# Patient Record
Sex: MALE | Race: WHITE | NOT HISPANIC OR LATINO | URBAN - METROPOLITAN AREA
[De-identification: names, ages, dates, MRNs, and addresses within clinical notes are randomized per-mention and may not be internally consistent; named-entity substitution may affect disease eponyms.]

---

## 2022-08-17 ENCOUNTER — NEW REFERRAL (OUTPATIENT)
Dept: URBAN - METROPOLITAN AREA CLINIC 46 | Facility: CLINIC | Age: 72
End: 2022-08-17

## 2022-08-17 DIAGNOSIS — H16.223: ICD-10-CM

## 2022-08-17 DIAGNOSIS — Z96.1: ICD-10-CM

## 2022-08-17 DIAGNOSIS — H43.813: ICD-10-CM

## 2022-08-17 DIAGNOSIS — Z79.899: ICD-10-CM

## 2022-08-17 DIAGNOSIS — H35.383: ICD-10-CM

## 2022-08-17 PROCEDURE — 92134 CPTRZ OPH DX IMG PST SGM RTA: CPT

## 2022-08-17 PROCEDURE — 92202 OPSCPY EXTND ON/MAC DRAW: CPT

## 2022-08-17 PROCEDURE — 99244 OFF/OP CNSLTJ NEW/EST MOD 40: CPT

## 2022-08-17 ASSESSMENT — VISUAL ACUITY
OD_SC: 20/20-2
OS_SC: 20/20
OS_SC: 20/20-1
OD_SC: 20/20

## 2022-08-17 ASSESSMENT — TONOMETRY
OS_IOP_MMHG: 9
OD_IOP_MMHG: 8

## 2022-11-10 ENCOUNTER — PROBLEM (OUTPATIENT)
Dept: URBAN - METROPOLITAN AREA CLINIC 105 | Facility: CLINIC | Age: 72
End: 2022-11-10

## 2022-11-10 DIAGNOSIS — H18.603: ICD-10-CM

## 2022-11-10 DIAGNOSIS — H35.383: ICD-10-CM

## 2022-11-10 DIAGNOSIS — H43.813: ICD-10-CM

## 2022-11-10 DIAGNOSIS — H53.19: ICD-10-CM

## 2022-11-10 DIAGNOSIS — H16.223: ICD-10-CM

## 2022-11-10 PROCEDURE — 92134 CPTRZ OPH DX IMG PST SGM RTA: CPT

## 2022-11-10 PROCEDURE — 92014 COMPRE OPH EXAM EST PT 1/>: CPT

## 2022-11-10 PROCEDURE — 92202 OPSCPY EXTND ON/MAC DRAW: CPT

## 2022-11-10 ASSESSMENT — VISUAL ACUITY
OD_SC: 20/20-1
OS_SC: 20/20

## 2022-11-10 ASSESSMENT — TONOMETRY
OD_IOP_MMHG: 8
OS_IOP_MMHG: 14

## 2022-12-07 ENCOUNTER — FOLLOW UP (OUTPATIENT)
Dept: URBAN - METROPOLITAN AREA CLINIC 46 | Facility: CLINIC | Age: 72
End: 2022-12-07

## 2022-12-07 DIAGNOSIS — H18.603: ICD-10-CM

## 2022-12-07 DIAGNOSIS — H16.223: ICD-10-CM

## 2022-12-07 DIAGNOSIS — H43.813: ICD-10-CM

## 2022-12-07 DIAGNOSIS — H35.383: ICD-10-CM

## 2022-12-07 DIAGNOSIS — H53.19: ICD-10-CM

## 2022-12-07 PROCEDURE — 92202 OPSCPY EXTND ON/MAC DRAW: CPT

## 2022-12-07 PROCEDURE — 92250 FUNDUS PHOTOGRAPHY W/I&R: CPT

## 2022-12-07 PROCEDURE — 92014 COMPRE OPH EXAM EST PT 1/>: CPT

## 2022-12-07 PROCEDURE — 92134 CPTRZ OPH DX IMG PST SGM RTA: CPT

## 2022-12-07 ASSESSMENT — TONOMETRY
OS_IOP_MMHG: 13
OD_IOP_MMHG: 9

## 2022-12-07 ASSESSMENT — VISUAL ACUITY
OD_SC: 20/20-2
OS_SC: 20/20-1

## 2022-12-19 ENCOUNTER — PROBLEM (OUTPATIENT)
Dept: URBAN - METROPOLITAN AREA CLINIC 46 | Facility: CLINIC | Age: 72
End: 2022-12-19

## 2022-12-19 DIAGNOSIS — H35.383: ICD-10-CM

## 2022-12-19 DIAGNOSIS — G43.B0: ICD-10-CM

## 2022-12-19 DIAGNOSIS — H53.19: ICD-10-CM

## 2022-12-19 DIAGNOSIS — M06.9: ICD-10-CM

## 2022-12-19 PROCEDURE — 92012 INTRM OPH EXAM EST PATIENT: CPT

## 2022-12-19 PROCEDURE — 92202 OPSCPY EXTND ON/MAC DRAW: CPT

## 2022-12-19 ASSESSMENT — VISUAL ACUITY
OD_SC: 20/30+2
OS_SC: 20/20

## 2022-12-19 ASSESSMENT — TONOMETRY
OD_IOP_MMHG: 9
OS_IOP_MMHG: 9

## 2023-01-17 ENCOUNTER — TELEPHONE (OUTPATIENT)
Dept: NEUROSURGERY | Facility: CLINIC | Age: 73
End: 2023-01-17
Payer: MEDICARE

## 2023-01-17 DIAGNOSIS — M43.16 SPONDYLOLISTHESIS AT L4-L5 LEVEL: Primary | ICD-10-CM

## 2023-01-17 DIAGNOSIS — M81.0 OSTEOPOROSIS, SENILE: ICD-10-CM

## 2023-01-17 NOTE — TELEPHONE ENCOUNTER
New Patient:   Referred to Dr. Wiggins    Referring Provider:   Dr. Edil Mann (PCP) [place], [place], records scanned in chart    MRI:  Lumbar Spine 1/13/2023 @ [facility] in [place], [place], report scanned in chart, patient has disc    X-rays: Lumbar Spine 11/17/21 @ [facility], report scanned in chart    Dexa: No    EMG: No    Onset of sympotms/reason for visit:   Patient has had symptoms for 11 years. Low back pain that is achy radiating down both legs to the knees and sometimes ankles. He has tingling in both feet and hands. Weakness in both legs sometimes. Patient is concerned he is developing cauda equina. 7 months ago he started having trouble emptying his bladder in that he has to use a log of strength and it can take up to 15 minutes to empty. 4 months ago he wet the bed 1 time and has had 3 incidents of lack of bowel control. Pain goes from 4-8/10.     Physical Therapy: 2 yrs ago @[facility] in [place], [place] records scanned in chart    Pain Management: Injections 2022 Dr. Yusef Patel in [place], [place] (last one 10/22 helped for 3 months), records scanned in chart    Previous Surgery: No    Endocrinologist: No    Rheumatologist: Dr. Manoj Tavarez in [place] for osteopenia, records scanned in chart    Insurance: Medicare/[employer] sup

## 2023-01-18 NOTE — TELEPHONE ENCOUNTER
Patient scheduled 2/16 @ 2pm. Advised to have dexa and xray and bring MRI and xray discs. NP ppw emailed.

## 2023-01-18 NOTE — TELEPHONE ENCOUNTER
I've entered a response to the patient's message below.    Response to the message:       Lumbar Xrays (complete w/ flexion-extension).  DEXA.

## 2023-02-16 ENCOUNTER — OFFICE VISIT (OUTPATIENT)
Dept: NEUROSURGERY | Facility: CLINIC | Age: 73
End: 2023-02-16
Payer: MEDICARE

## 2023-02-16 VITALS
HEART RATE: 100 BPM | TEMPERATURE: 97.4 F | BODY MASS INDEX: 24.59 KG/M2 | WEIGHT: 153 LBS | SYSTOLIC BLOOD PRESSURE: 143 MMHG | HEIGHT: 66 IN | DIASTOLIC BLOOD PRESSURE: 84 MMHG | RESPIRATION RATE: 16 BRPM | OXYGEN SATURATION: 98 %

## 2023-02-16 DIAGNOSIS — M81.0 OSTEOPOROSIS, SENILE: ICD-10-CM

## 2023-02-16 DIAGNOSIS — M43.16 SPONDYLOLISTHESIS AT L4-L5 LEVEL: Primary | ICD-10-CM

## 2023-02-16 PROCEDURE — 99205 OFFICE O/P NEW HI 60 MIN: CPT | Performed by: NEUROLOGICAL SURGERY

## 2023-02-16 RX ORDER — BUPROPION HYDROCHLORIDE 150 MG/1
TABLET ORAL
COMMUNITY

## 2023-02-16 RX ORDER — AZELASTINE 1 MG/ML
1 SPRAY, METERED NASAL 2 TIMES DAILY
COMMUNITY

## 2023-02-16 RX ORDER — CAPTOPRIL 50 MG/1
TABLET ORAL
COMMUNITY

## 2023-02-16 RX ORDER — CICLOPIROX 1 G/100ML
SHAMPOO TOPICAL NIGHTLY
COMMUNITY

## 2023-02-16 RX ORDER — CITALOPRAM 40 MG/1
TABLET, FILM COATED ORAL
COMMUNITY

## 2023-02-16 RX ORDER — FLUTICASONE PROPIONATE 50 MCG
1 SPRAY, SUSPENSION (ML) NASAL DAILY
COMMUNITY

## 2023-02-16 RX ORDER — ROSUVASTATIN CALCIUM 40 MG/1
TABLET, COATED ORAL
COMMUNITY
Start: 2023-01-12

## 2023-02-16 RX ORDER — CLONAZEPAM 2 MG/1
TABLET ORAL
COMMUNITY

## 2023-02-16 RX ORDER — DENOSUMAB 60 MG/ML
INJECTION SUBCUTANEOUS
COMMUNITY

## 2023-02-16 NOTE — PROGRESS NOTES
Dear Dr. Tavarez,    I had the pleasure of meeting a patient of yours in the office today.  Thank you for your kind referral.  As you may recall, Mr. Blum is a very pleasant 72-year-old gentleman with severe rheumatoid arthritis and osteoporosis on Prolia.  He had a more than 11-year history of chronic lower back pain.  He will get achiness in his legs but he is not having radicular pain, sciatica, numbness, paresthesias or difficulty with bowel or bladder function.  He had a few episodes of urinary issues but currently is not having any.  He has no issues controlling bowel function.  He had a recent flareup of back pain and now he has been using a walker or cane for ambulation.  He walks with very slow and measured steps to avoid falling.  He feels as if his gait has imbalance but again denies any focal weakness such as foot drop, etc.  He has seen pain management in the past.  He did respond to facet injections.  They lasted 5 to 6 weeks and then the symptoms returned.  He has never had an ablation for his back pain.  He presents for neurosurgical opinion.    Of note, he has received Prolia treatments, twice annually, for 2 years.  He has never been on anabolic bone building agent.  He is limited in some medications he can take due to a kidney disorder that causes him to create kidney stones.  Until recently he was taking Plaquenil for his rheumatoid arthritis but was having side effects and has had to stop.  He has had worsening RA symptoms throughout his body since that time.    Imaging: I personally reviewed his lumbar MRI and ordered him x-rays with dynamic views and a DEXA scan.  His DEXA scan shows a T score of -2.8 in the lumbar spine indicating osteoporosis.  Has bilateral L4 pars defects resulting in a grade 2 L4-5 spondylolisthesis.  This is causing severe collapse of the foramina with compression of the exiting L4 nerve roots and severe lateral recess stenosis compressing the traversing L5 nerve roots  in the canal.                  Review of Systems: 14 point review of systems are negative except for the following pertinent positives: Fatigue, visual disturbances, environmental allergies, headaches and joint pain and endorses depression and anxiety.    Medical History:   Past Medical History:   Diagnosis Date   • Arthritis    • GERD (gastroesophageal reflux disease)    • Kidney disease    • Kidney stones    • Osteoporosis    • Sinusitis        Surgical History:   Past Surgical History:   Procedure Laterality Date   • CATARACT EXTRACTION     • FOOT SURGERY     • KIDNEY SURGERY      Removal   • PROSTATE SURGERY     • SINUS SURGERY         Social History:   Social History     Socioeconomic History   • Marital status:      Spouse name: None   • Number of children: None   • Years of education: None   • Highest education level: None   Tobacco Use   • Smoking status: Former     Types: Cigarettes     Quit date:      Years since quittin.1   • Smokeless tobacco: Never   Substance and Sexual Activity   • Alcohol use: Yes     Comment: liquor daily       Family History:   Family History   Problem Relation Age of Onset   • COPD Biological Mother    • Heart disease Biological Father        Allergies: Patient has no known allergies.    Home Medications:  Not in a hospital admission.    Current Medications:        Physicial Exam    Vital Signs   Vitals:    23 1351   BP: (!) 143/84   Pulse: 100   Resp: 16   Temp: 36.3 °C (97.4 °F)   SpO2: 98%       Awake, alert, oriented to person, place, time and situation; speech and fund of knowledge normal. Head NC/AT.  PERRL, extra-ocular movements intact, face symmetric, tongue protrudes to midline, no nystagmus or diplopia.  Neck is supple, has full range of motion and there is no evidence of JVD.  There is no tenderness to palpation.  Breathing comfortably without distress, tachypnea, wheezing or use of accessory muscles.  Heart has a regular rate and rhythm. Abdomen  ND.   Skin is warm, well perfused, with good capillary refill.  Limbs show no deformities or edema.    Neurological examination:    PERRL, extra-ocular movements intact, face symmetric, tongue protrudes to midline, no nystagmus or diplopia.  Motor:     RUE:  D  5/5, B  5/5, T 5/5, WE 5/5, HG 5/5, IH 5/5   LUE:  D  5/5, B  5/5, T 5/5, WE 5/5, HG 5/5, IH 5/5   RLE:  IP  5/5, Q  5/5, DF 5/5, EHL 5/5, PF 5/5   LLE:  IP  5/5, Q  5/5, DF 5/5, EHL 5/5, PF 5/5  Reflexes:  Normoreflexive, no Peña's or Babinski signs, no clonus  Sensory exam:  Intact to light touch, pain, temperature and JPS testing    He walks with a very slow and measured gait.  He has a very short stride length and shuffles.      Assessment/Plan     This is a 72-year-old gentleman with osteoporosis on Prolia who also has rheumatoid arthritis.  He has chronic bilateral L4 pars fractures bulging and a chronic grade 2 L4-5 spondylolisthesis and severe lateral recess and foraminal stenosis.  He is neurologically intact and does not require any urgent surgical intervention.  At this time, he is full-blown osteoporosis despite being on Prolia and he is a poor candidate for any surgical intervention.  Any surgical intervention would require an instrumented fusion across the L4-5 with possible addition of interbody cages to help restore foraminal height.  This has a high risk of failure due to his poor bone quality.  I have encouraged him to follow-up with yourself to consider treatment with Tymlos or Forteo, direct anabolic bone builders as the antiresorptive Prolia may not be powerful enough for him.  He may require an instrumented fusion in the future and his bone quality would need to be significantly improved before he would be a candidate for this procedure.  All questions were answered.  Thank you very much for the opportunity to be involved in the care of your patient.  Please call the office should any questions or problems arise.    Sincerely,        Luis Manuel Wiggins MD

## 2023-02-16 NOTE — LETTER
February 16, 2023     Manoj Tavarez  [address] [facility] [address]    Patient: Armand Barr  YOB: 1950  Date of Visit: 2/16/2023      Dear Dr. Tavarez:    Thank you for referring Armand Barr to me for evaluation. Below are my notes for this consultation.    If you have questions, please do not hesitate to call me. I look forward to following your patient along with you.         Sincerely,        Luis Manuel Wiggins MD        CC: No Recipients  Luis Manuel Wiggins MD  2/16/2023  2:02 PM  Sign when Signing Visit  Dear ***,   ***.     Imaging: I personally reviewed his lumbar MRI and ordered him x-rays with dynamic views and a DEXA scan.  His DEXA scan shows a T score of -2.8 in the lumbar spine indicating osteoporosis.  Has bilateral L4 pars defects resulting in a grade 2 L4-5 spondylolisthesis.  This is causing severe collapse of the foramina with compression of the exiting L4 nerve roots and severe lateral recess stenosis compressing the traversing L5 nerve roots in the canal.                  Review of Systems: 14 point review of systems are negative except for the following pertinent positives:***.    Medical History: No past medical history on file.    Surgical History: No past surgical history on file.    Social History:   Social History     Socioeconomic History   • Marital status: Unknown       Family History: No family history on file.    Allergies: Patient has no allergy information on record.    Home Medications:  Not in a hospital admission.    Current Medications:        Physicial Exam    Vital Signs There were no vitals filed for this visit.    Awake, alert, oriented to person, place, time and situation; speech and fund of knowledge normal. Head NC/AT.  PERRL, extra-ocular movements intact, face symmetric, tongue protrudes to midline, no nystagmus or diplopia.  Neck is supple, has full range of motion and there is no evidence of JVD.  There is no tenderness to palpation.  Breathing  comfortably without distress, tachypnea, wheezing or use of accessory muscles.  Heart has a regular rate and rhythm. Abdomen ND.   Skin is warm, well perfused, with good capillary refill.  Limbs show no deformities or edema.    Neurological examination:    PERRL, extra-ocular movements intact, face symmetric, tongue protrudes to midline, no nystagmus or diplopia.  Motor:     RUE:  D  5/5, B  5/5, T 5/5, WE 5/5, HG 5/5, IH 5/5   LUE:  D  5/5, B  5/5, T 5/5, WE 5/5, HG 5/5, IH 5/5   RLE:  IP  5/5, Q  5/5, DF 5/5, EHL 5/5, PF 5/5   LLE:  IP  5/5, Q  5/5, DF 5/5, EHL 5/5, PF 5/5  Reflexes:  Normoreflexive, no Peña's or Babinski signs, no clonus  Sensory exam:  Intact to light touch, pain, temperature and JPS testing  Gait and posture normal.  No dysmetria.      Assessment/Plan        ***

## 2023-02-16 NOTE — LETTER
February 16, 2023     Manoj Tavarez  [address] [facility] [address]    Patient: Armand Barr  YOB: 1950  Date of Visit: 2/16/2023      Dear Dr. Tavarez:    Thank you for referring Armand Barr to me for evaluation. Below are my notes for this consultation.    If you have questions, please do not hesitate to call me. I look forward to following your patient along with you.         Sincerely,        Luis Manuel Wiggins MD        CC: No Recipients  Luis Manuel Wiggins MD  2/16/2023  2:40 PM  Signed  Dear Dr. Tavarez,    I had the pleasure of meeting a patient of yours in the office today.  Thank you for your kind referral.  As you may recall, Mr. Blum is a very pleasant 72-year-old gentleman with severe rheumatoid arthritis and osteoporosis on Prolia.  He had a more than 11-year history of chronic lower back pain.  He will get achiness in his legs but he is not having radicular pain, sciatica, numbness, paresthesias or difficulty with bowel or bladder function.  He had a few episodes of urinary issues but currently is not having any.  He has no issues controlling bowel function.  He had a recent flareup of back pain and now he has been using a walker or cane for ambulation.  He walks with very slow and measured steps to avoid falling.  He feels as if his gait has imbalance but again denies any focal weakness such as foot drop, etc.  He has seen pain management in the past.  He did respond to facet injections.  They lasted 5 to 6 weeks and then the symptoms returned.  He has never had an ablation for his back pain.  He presents for neurosurgical opinion.    Of note, he has received Prolia treatments, twice annually, for 2 years.  He has never been on anabolic bone building agent.  He is limited in some medications he can take due to a kidney disorder that causes him to create kidney stones.  Until recently he was taking Plaquenil for his rheumatoid arthritis but was having side effects and has had  to stop.  He has had worsening RA symptoms throughout his body since that time.    Imaging: I personally reviewed his lumbar MRI and ordered him x-rays with dynamic views and a DEXA scan.  His DEXA scan shows a T score of -2.8 in the lumbar spine indicating osteoporosis.  Has bilateral L4 pars defects resulting in a grade 2 L4-5 spondylolisthesis.  This is causing severe collapse of the foramina with compression of the exiting L4 nerve roots and severe lateral recess stenosis compressing the traversing L5 nerve roots in the canal.                  Review of Systems: 14 point review of systems are negative except for the following pertinent positives: Fatigue, visual disturbances, environmental allergies, headaches and joint pain and endorses depression and anxiety.    Medical History:   Past Medical History:   Diagnosis Date   • Arthritis    • GERD (gastroesophageal reflux disease)    • Kidney disease    • Kidney stones    • Osteoporosis    • Sinusitis        Surgical History:   Past Surgical History:   Procedure Laterality Date   • CATARACT EXTRACTION     • FOOT SURGERY     • KIDNEY SURGERY      Removal   • PROSTATE SURGERY     • SINUS SURGERY         Social History:   Social History     Socioeconomic History   • Marital status:      Spouse name: None   • Number of children: None   • Years of education: None   • Highest education level: None   Tobacco Use   • Smoking status: Former     Types: Cigarettes     Quit date:      Years since quittin.1   • Smokeless tobacco: Never   Substance and Sexual Activity   • Alcohol use: Yes     Comment: liquor daily       Family History:   Family History   Problem Relation Age of Onset   • COPD Biological Mother    • Heart disease Biological Father        Allergies: Patient has no known allergies.    Home Medications:  Not in a hospital admission.    Current Medications:        Physicial Exam    Vital Signs   Vitals:    23 1351   BP: (!) 143/84   Pulse: 100    Resp: 16   Temp: 36.3 °C (97.4 °F)   SpO2: 98%       Awake, alert, oriented to person, place, time and situation; speech and fund of knowledge normal. Head NC/AT.  PERRL, extra-ocular movements intact, face symmetric, tongue protrudes to midline, no nystagmus or diplopia.  Neck is supple, has full range of motion and there is no evidence of JVD.  There is no tenderness to palpation.  Breathing comfortably without distress, tachypnea, wheezing or use of accessory muscles.  Heart has a regular rate and rhythm. Abdomen ND.   Skin is warm, well perfused, with good capillary refill.  Limbs show no deformities or edema.    Neurological examination:    PERRL, extra-ocular movements intact, face symmetric, tongue protrudes to midline, no nystagmus or diplopia.  Motor:     RUE:  D  5/5, B  5/5, T 5/5, WE 5/5, HG 5/5, IH 5/5   LUE:  D  5/5, B  5/5, T 5/5, WE 5/5, HG 5/5, IH 5/5   RLE:  IP  5/5, Q  5/5, DF 5/5, EHL 5/5, PF 5/5   LLE:  IP  5/5, Q  5/5, DF 5/5, EHL 5/5, PF 5/5  Reflexes:  Normoreflexive, no Peña's or Babinski signs, no clonus  Sensory exam:  Intact to light touch, pain, temperature and JPS testing    He walks with a very slow and measured gait.  He has a very short stride length and shuffles.      Assessment/Plan      This is a 72-year-old gentleman with osteoporosis on Prolia who also has rheumatoid arthritis.  He has chronic bilateral L4 pars fractures bulging and a chronic grade 2 L4-5 spondylolisthesis and severe lateral recess and foraminal stenosis.  He is neurologically intact and does not require any urgent surgical intervention.  At this time, he is full-blown osteoporosis despite being on Prolia and he is a poor candidate for any surgical intervention.  Any surgical intervention would require an instrumented fusion across the L4-5 with possible addition of interbody cages to help restore foraminal height.  This has a high risk of failure due to his poor bone quality.  I have encouraged him to  follow-up with yourself to consider treatment with Tymlos or Forteo, direct anabolic bone builders as the antiresorptive Prolia may not be powerful enough for him.  He may require an instrumented fusion in the future and his bone quality would need to be significantly improved before he would be a candidate for this procedure.  All questions were answered.  Thank you very much for the opportunity to be involved in the care of your patient.  Please call the office should any questions or problems arise.    Sincerely,       Luis Manuel Wiggins MD

## 2023-02-21 ENCOUNTER — FOLLOW UP (OUTPATIENT)
Dept: URBAN - METROPOLITAN AREA CLINIC 105 | Facility: CLINIC | Age: 73
End: 2023-02-21

## 2023-02-21 DIAGNOSIS — H16.223: ICD-10-CM

## 2023-02-21 DIAGNOSIS — H35.383: ICD-10-CM

## 2023-02-21 DIAGNOSIS — H43.813: ICD-10-CM

## 2023-02-21 PROCEDURE — 92134 CPTRZ OPH DX IMG PST SGM RTA: CPT

## 2023-02-21 PROCEDURE — 92014 COMPRE OPH EXAM EST PT 1/>: CPT

## 2023-02-21 PROCEDURE — 92202 OPSCPY EXTND ON/MAC DRAW: CPT

## 2023-02-21 ASSESSMENT — TONOMETRY
OS_IOP_MMHG: 11
OD_IOP_MMHG: 09

## 2023-02-21 ASSESSMENT — VISUAL ACUITY
OS_SC: 20/20
OD_SC: 20/25-2

## 2023-08-02 ENCOUNTER — FOLLOW UP (OUTPATIENT)
Dept: URBAN - METROPOLITAN AREA CLINIC 46 | Facility: CLINIC | Age: 73
End: 2023-08-02

## 2023-08-02 DIAGNOSIS — H16.223: ICD-10-CM

## 2023-08-02 DIAGNOSIS — H18.603: ICD-10-CM

## 2023-08-02 DIAGNOSIS — H43.813: ICD-10-CM

## 2023-08-02 DIAGNOSIS — H35.383: ICD-10-CM

## 2023-08-02 DIAGNOSIS — H53.19: ICD-10-CM

## 2023-08-02 PROCEDURE — 92014 COMPRE OPH EXAM EST PT 1/>: CPT

## 2023-08-02 PROCEDURE — 92202 OPSCPY EXTND ON/MAC DRAW: CPT

## 2023-08-02 PROCEDURE — 92134 CPTRZ OPH DX IMG PST SGM RTA: CPT

## 2023-08-02 ASSESSMENT — VISUAL ACUITY
OD_SC: 20/25+2
OS_SC: 20/20-1

## 2023-08-02 ASSESSMENT — TONOMETRY
OS_IOP_MMHG: 10
OD_IOP_MMHG: 9

## 2024-02-07 ENCOUNTER — FOLLOW UP (OUTPATIENT)
Dept: URBAN - METROPOLITAN AREA CLINIC 46 | Facility: CLINIC | Age: 74
End: 2024-02-07

## 2024-02-07 DIAGNOSIS — H35.383: ICD-10-CM

## 2024-02-07 DIAGNOSIS — H53.19: ICD-10-CM

## 2024-02-07 DIAGNOSIS — M35.00: ICD-10-CM

## 2024-02-07 DIAGNOSIS — M06.9: ICD-10-CM

## 2024-02-07 DIAGNOSIS — H43.813: ICD-10-CM

## 2024-02-07 PROCEDURE — 92134 CPTRZ OPH DX IMG PST SGM RTA: CPT

## 2024-02-07 PROCEDURE — 92014 COMPRE OPH EXAM EST PT 1/>: CPT

## 2024-02-07 PROCEDURE — 92202 OPSCPY EXTND ON/MAC DRAW: CPT

## 2024-02-07 ASSESSMENT — TONOMETRY
OS_IOP_MMHG: 10
OD_IOP_MMHG: 8

## 2024-02-07 ASSESSMENT — VISUAL ACUITY
OS_SC: 20/20
OD_SC: 20/20-2

## 2024-09-04 ENCOUNTER — FOLLOW UP (OUTPATIENT)
Dept: URBAN - METROPOLITAN AREA CLINIC 46 | Facility: CLINIC | Age: 74
End: 2024-09-04

## 2024-09-04 DIAGNOSIS — H53.19: ICD-10-CM

## 2024-09-04 DIAGNOSIS — Z96.1: ICD-10-CM

## 2024-09-04 DIAGNOSIS — M35.00: ICD-10-CM

## 2024-09-04 DIAGNOSIS — H35.383: ICD-10-CM

## 2024-09-04 DIAGNOSIS — H43.813: ICD-10-CM

## 2024-09-04 PROCEDURE — 92202 OPSCPY EXTND ON/MAC DRAW: CPT

## 2024-09-04 PROCEDURE — 92134 CPTRZ OPH DX IMG PST SGM RTA: CPT

## 2024-09-04 PROCEDURE — 92014 COMPRE OPH EXAM EST PT 1/>: CPT

## 2024-09-04 ASSESSMENT — TONOMETRY
OD_IOP_MMHG: 9
OS_IOP_MMHG: 11

## 2024-09-04 ASSESSMENT — VISUAL ACUITY
OD_SC: 20/20-2
OS_SC: 20/25-1